# Patient Record
Sex: MALE | Race: WHITE | NOT HISPANIC OR LATINO | Employment: OTHER | URBAN - NONMETROPOLITAN AREA
[De-identification: names, ages, dates, MRNs, and addresses within clinical notes are randomized per-mention and may not be internally consistent; named-entity substitution may affect disease eponyms.]

---

## 2020-10-29 DIAGNOSIS — M25.512 ACUTE PAIN OF LEFT SHOULDER: Primary | ICD-10-CM

## 2020-11-03 ENCOUNTER — OFFICE VISIT (OUTPATIENT)
Dept: ORTHOPEDIC SURGERY | Facility: CLINIC | Age: 65
End: 2020-11-03

## 2020-11-03 VITALS — BODY MASS INDEX: 34.06 KG/M2 | HEIGHT: 73 IN | WEIGHT: 257 LBS

## 2020-11-03 DIAGNOSIS — M19.019 AC JOINT ARTHROPATHY: ICD-10-CM

## 2020-11-03 DIAGNOSIS — M25.512 ACUTE PAIN OF LEFT SHOULDER: Primary | ICD-10-CM

## 2020-11-03 DIAGNOSIS — M75.42 IMPINGEMENT SYNDROME OF LEFT SHOULDER: ICD-10-CM

## 2020-11-03 DIAGNOSIS — M75.102 ROTATOR CUFF SYNDROME OF LEFT SHOULDER: ICD-10-CM

## 2020-11-03 PROCEDURE — 99203 OFFICE O/P NEW LOW 30 MIN: CPT | Performed by: NURSE PRACTITIONER

## 2020-11-03 PROCEDURE — 20610 DRAIN/INJ JOINT/BURSA W/O US: CPT | Performed by: NURSE PRACTITIONER

## 2020-11-03 RX ORDER — GEMFIBROZIL 600 MG/1
600 TABLET, FILM COATED ORAL DAILY
COMMUNITY
Start: 2020-08-28

## 2020-11-03 RX ORDER — LOPERAMIDE HCL/SIMETHICONE 2-125MG
TABLET ORAL
COMMUNITY

## 2020-11-03 RX ORDER — AMLODIPINE BESYLATE 5 MG/1
5 TABLET ORAL DAILY
COMMUNITY
Start: 2020-10-15

## 2020-11-03 RX ORDER — PIOGLITAZONEHYDROCHLORIDE 30 MG/1
30 TABLET ORAL DAILY
COMMUNITY
Start: 2020-10-15

## 2020-11-03 RX ORDER — LIDOCAINE HYDROCHLORIDE 10 MG/ML
2 INJECTION, SOLUTION INFILTRATION; PERINEURAL
Status: COMPLETED | OUTPATIENT
Start: 2020-11-03 | End: 2020-11-03

## 2020-11-03 RX ORDER — TRIAMCINOLONE ACETONIDE 40 MG/ML
40 INJECTION, SUSPENSION INTRA-ARTICULAR; INTRAMUSCULAR
Status: COMPLETED | OUTPATIENT
Start: 2020-11-03 | End: 2020-11-03

## 2020-11-03 RX ORDER — CHLORAL HYDRATE 500 MG
CAPSULE ORAL
COMMUNITY

## 2020-11-03 RX ORDER — ATORVASTATIN CALCIUM 20 MG/1
20 TABLET, FILM COATED ORAL DAILY
COMMUNITY
Start: 2020-08-28

## 2020-11-03 RX ORDER — ESOMEPRAZOLE MAGNESIUM 20 MG/1
FOR SUSPENSION ORAL
COMMUNITY

## 2020-11-03 RX ORDER — LOSARTAN POTASSIUM 100 MG/1
100 TABLET ORAL DAILY
COMMUNITY
Start: 2020-10-15

## 2020-11-03 RX ADMIN — TRIAMCINOLONE ACETONIDE 40 MG: 40 INJECTION, SUSPENSION INTRA-ARTICULAR; INTRAMUSCULAR at 14:03

## 2020-11-03 RX ADMIN — LIDOCAINE HYDROCHLORIDE 2 ML: 10 INJECTION, SOLUTION INFILTRATION; PERINEURAL at 14:03

## 2020-11-03 NOTE — PROGRESS NOTES
Sergey Yanez is a 65 y.o. male   Primary provider:  Provider, No Known       Chief Complaint   Patient presents with   • Left Shoulder - Pain, Initial Evaluation     HISTORY OF PRESENT ILLNESS:    65-year-old male patient presents to office for evaluation of acute left shoulder pain.  Onset of pain occurred approximately 2 months ago with no known injury.  Patient complains of difficulty with use of his left arm, limited range of motion and weakness.  Patient complains of stiffness and limitations in the joint.  Pain is described as intermittent and severe.  Pain is described as aching in nature.  No swelling or bruising reported.  No numbness or tingling.  Pain is worse with movement/use of the left arm/shoulder and lifting.  Patient has tried rest and anti-inflammatory medications with minimal improvement.  Patient has been taking Ibuprofen 800 mg twice daily.  Pain scale today is 2/10.  X-rays are performed in office today.    Pain  This is a new problem. The current episode started more than 1 month ago (2 months). The problem occurs intermittently. The problem has been gradually worsening. Associated symptoms include arthralgias and weakness (Left arm/shoulder). Pertinent negatives include no joint swelling. Associated symptoms comments: Aching pain, limited ROM. Exacerbated by: movement/use of left arm, lifting. He has tried NSAIDs and rest for the symptoms. The treatment provided mild relief.     CONCURRENT MEDICAL HISTORY:    Past Medical History:   Diagnosis Date   • Diabetes (CMS/[facility])    • GERD (gastroesophageal reflux disease)    • Hyperlipidemia    • Hypertension    • Rotator cuff syndrome of left shoulder 11/3/2020       Allergies   Allergen Reactions   • Penicillins Unknown - Low Severity         Current Outpatient Medications:   •  amLODIPine (NORVASC) 5 MG tablet, Take 5 mg by mouth Daily., Disp: , Rfl:   •  atorvastatin (LIPITOR) 20 MG tablet, Take 20 mg by mouth Daily., Disp: , Rfl:   •   esomeprazole (nexIUM) 20 MG packet, Nexium 20 mg capsule,delayed release  Take 1 capsule every day by oral route., Disp: , Rfl:   •  gemfibrozil (LOPID) 600 MG tablet, Take 600 mg by mouth Daily., Disp: , Rfl:   •  Loperamide-Simethicone (Imodium Multi-Symptom Relief) 2-125 MG tablet, Antidiarrheal 2 mg tablet  Take 1 tablet every day by oral route., Disp: , Rfl:   •  losartan (COZAAR) 100 MG tablet, Take 100 mg by mouth Daily., Disp: , Rfl:   •  metFORMIN (GLUCOPHAGE) 500 MG tablet, TK 2 TS PO BID WITH OR AFTER MEALS, Disp: , Rfl:   •  Omega-3 Fatty Acids (fish oil) 1000 MG capsule capsule, Fish Oil, Disp: , Rfl:   •  pioglitazone (ACTOS) 30 MG tablet, Take 30 mg by mouth Daily., Disp: , Rfl:     Past Surgical History:   Procedure Laterality Date   • GALLBLADDER SURGERY  2000   • JOINT REPLACEMENT Right 02/2016    Right knee replacement       Family History   Problem Relation Age of Onset   • Hypertension Father    • Clotting disorder Father    • Hypertension Sister    • Diabetes Sister    • Clotting disorder Sister         Social History     Socioeconomic History   • Marital status:      Spouse name: Not on file   • Number of children: Not on file   • Years of education: Not on file   • Highest education level: Not on file   Tobacco Use   • Smoking status: Never Smoker   • Smokeless tobacco: Never Used   Substance and Sexual Activity   • Alcohol use: Yes     Comment: 12 a week   • Drug use: Never   • Sexual activity: Defer        Review of Systems   Constitutional: Positive for activity change.   HENT: Positive for tinnitus.    Eyes: Negative.    Respiratory: Negative.    Cardiovascular: Negative.    Gastrointestinal: Negative.    Endocrine: Negative.    Genitourinary: Negative.    Musculoskeletal: Positive for arthralgias. Negative for joint swelling.        Right shoulder pain. Joint stiffness.    Allergic/Immunologic: Negative.    Neurological: Positive for weakness (Left arm/shoulder).   Hematological:  "Negative.    Psychiatric/Behavioral: Positive for sleep disturbance.       PHYSICAL EXAMINATION:       Ht 185.4 cm (73\")   Wt 117 kg (257 lb)   BMI 33.91 kg/m²     Physical Exam  Vitals signs reviewed.   Constitutional:       General: He is not in acute distress.     Appearance: He is well-developed. He is not ill-appearing.   HENT:      Head: Normocephalic.   Pulmonary:      Effort: Pulmonary effort is normal. No respiratory distress.   Abdominal:      General: There is no distension.      Palpations: Abdomen is soft.   Musculoskeletal:         General: Tenderness (Left shoulder) present. No swelling, deformity or signs of injury.   Skin:     General: Skin is warm and dry.      Capillary Refill: Capillary refill takes less than 2 seconds.      Findings: No erythema.   Neurological:      Mental Status: He is alert and oriented to person, place, and time.      GCS: GCS eye subscore is 4. GCS verbal subscore is 5. GCS motor subscore is 6.   Psychiatric:         Speech: Speech normal.         Behavior: Behavior normal.         Thought Content: Thought content normal.         Judgment: Judgment normal.         GAIT:     [x]  Normal  []  Antalgic    Assistive device: [x]  None  []  Walker     []  Crutches  []  Cane     []  Wheelchair  []  Stretcher    Right Shoulder Exam     Tenderness   The patient is experiencing no tenderness.    Range of Motion   The patient has normal right shoulder ROM.    Muscle Strength   The patient has normal right shoulder strength.    Tests   Duarte test: negative  Cross arm: negative  Impingement: negative  Drop arm: negative    Other   Erythema: absent  Sensation: normal  Pulse: present      Left Shoulder Exam     Tenderness   The patient is experiencing tenderness in the acromioclavicular joint (Mild, diffuse).    Range of Motion   Active abduction: 120   Passive abduction: 160   Extension: 40   External rotation: 80   Forward flexion: 150   Internal rotation 90 degrees: 70     Muscle " Strength   Abduction: 3/5   Supraspinatus: 3/5     Tests   Duarte test: positive  Cross arm: positive  Impingement: positive  Drop arm: negative    Other   Erythema: absent  Sensation: normal  Pulse: present     Comments:  Pain and limitations with range of motion.  Pain increases against resistance.  Weakness is noted.  Empty can test positive.  Full can test positive.  Impingement sign positive.  No swelling appreciated.  No erythema.  No warmth.  No signs of infection noted.  Neurovascularly intact.            Xr Shoulder 2+ View Left    Result Date: 11/3/2020  Narrative: External rotation, internal rotation and scapular Y views of the left shoulder reveal no evidence of acute fracture or dislocation.  There are advanced degenerative changes noted at the acromioclavicular joint with very diminished joint spacing and osteophyte formations noted.  There are milder degenerative changes noted at the glenohumeral joint.  There are some mild subchondral cystic changes noted in the superior aspect of the humeral head.  The humeral head appears well-positioned within the glenoid.  Soft tissues appear unremarkable.  No acute bony radiologic abnormalities are noted at this time.  No comparison images are available for review.11/03/20 at 15:53 CST by TOM Crowe     Large Joint Arthrocentesis: L subacromial bursa  Date/Time: 11/3/2020 2:03 PM  Consent given by: patient  Timeout: Immediately prior to procedure a time out was called to verify the correct patient, procedure, equipment, support staff and site/side marked as required   Supporting Documentation  Indications: pain and diagnostic evaluation   Procedure Details  Location: shoulder - L subacromial bursa  Preparation: Patient was prepped and draped in the usual sterile fashion  Needle size: 22 G  Approach: posterior  Medications administered: 2 mL lidocaine 1 %; 40 mg triamcinolone acetonide 40 MG/ML  Patient tolerance: patient tolerated the procedure well  with no immediate complications        ASSESSMENT:    Diagnoses and all orders for this visit:    Acute pain of left shoulder  -     Large Joint Arthrocentesis: L subacromial bursa  -     MRI Shoulder Left Without Contrast; Future    AC joint arthropathy  -     Large Joint Arthrocentesis: L subacromial bursa  -     MRI Shoulder Left Without Contrast; Future    Impingement syndrome of left shoulder  -     Large Joint Arthrocentesis: L subacromial bursa  -     MRI Shoulder Left Without Contrast; Future    Rotator cuff syndrome of left shoulder  -     Large Joint Arthrocentesis: L subacromial bursa  -     MRI Shoulder Left Without Contrast; Future    PLAN    X-rays of the left shoulder performed in office today reviewed with no acute findings noted.  Patient has some fairly advanced degenerative changes noted in the AC joint.  The glenohumeral joint has some mild degenerative changes but overall appears in satisfactory condition.  Patient complains of left shoulder pain that started approximately 2 months ago and has continued to progressively worsen.  He has severe pain at times.  He has weakness and difficulty lifting his left arm.  Subjective complaints and physical exam are suspicious for impingement, rotator cuff pathology and possible rotator cuff tear.  Recommend MRI of the left shoulder to evaluate for rotator cuff tear, tendinitis/tendinosis, bursitis and/or impingement.  Recommend a subacromial injection of steroid today to the left shoulder for management of pain/inflammation.  Recommend rest and activity modification as tolerated and based on his pain with avoidance of heavy lifting, pulling, tugging and repetitive motions.  We discussed some gentle, progressive range of motion exercises with the left shoulder and pendulum exercises as tolerated.  Recommend ice therapy to the left shoulder intermittently 3-4 times daily for 15 minutes at a time to minimize pain/inflammation.  Patient is already taking  Ibuprofen 800 mg twice daily, which helps with his chronic right knee pain.  He can continue to take the Ibuprofen 800 mg.  Patient can also take Tylenol as needed for additional pain control.  We discussed that he may need a referral to physical therapy to help with range of motion and strengthening/conditioning of the left shoulder.  Follow-up after MRI to discuss results and discuss further treatment recommendations.    Patient's Body mass index is 33.91 kg/m². BMI is above normal parameters. Recommendations include: exercise counseling and nutrition counseling.    Return for follow up after MRI.      This document has been electronically signed by TOM Crowe on November 3, 2020 16:00 CST      TOM Crowe

## 2020-11-20 DIAGNOSIS — M75.42 IMPINGEMENT SYNDROME OF LEFT SHOULDER: ICD-10-CM

## 2020-11-20 DIAGNOSIS — M75.102 ROTATOR CUFF SYNDROME OF LEFT SHOULDER: ICD-10-CM

## 2020-11-20 DIAGNOSIS — M19.019 AC JOINT ARTHROPATHY: ICD-10-CM

## 2020-11-20 DIAGNOSIS — M25.512 ACUTE PAIN OF LEFT SHOULDER: ICD-10-CM

## 2020-12-04 ENCOUNTER — OFFICE VISIT (OUTPATIENT)
Dept: ORTHOPEDIC SURGERY | Facility: CLINIC | Age: 65
End: 2020-12-04

## 2020-12-04 VITALS — HEIGHT: 73 IN | BODY MASS INDEX: 33.93 KG/M2 | WEIGHT: 256 LBS

## 2020-12-04 DIAGNOSIS — M75.112 NONTRAUMATIC INCOMPLETE TEAR OF LEFT ROTATOR CUFF: ICD-10-CM

## 2020-12-04 DIAGNOSIS — M25.512 ACUTE PAIN OF LEFT SHOULDER: Primary | ICD-10-CM

## 2020-12-04 DIAGNOSIS — M75.42 IMPINGEMENT SYNDROME OF LEFT SHOULDER: ICD-10-CM

## 2020-12-04 DIAGNOSIS — M75.102 ROTATOR CUFF SYNDROME OF LEFT SHOULDER: ICD-10-CM

## 2020-12-04 DIAGNOSIS — M19.019 AC JOINT ARTHROPATHY: ICD-10-CM

## 2020-12-04 PROCEDURE — 99213 OFFICE O/P EST LOW 20 MIN: CPT | Performed by: NURSE PRACTITIONER

## 2020-12-04 PROCEDURE — 20610 DRAIN/INJ JOINT/BURSA W/O US: CPT | Performed by: NURSE PRACTITIONER

## 2020-12-04 RX ADMIN — LIDOCAINE HYDROCHLORIDE 2 ML: 10 INJECTION, SOLUTION INFILTRATION; PERINEURAL at 10:33

## 2020-12-04 RX ADMIN — TRIAMCINOLONE ACETONIDE 80 MG: 40 INJECTION, SUSPENSION INTRA-ARTICULAR; INTRAMUSCULAR at 10:33

## 2020-12-04 NOTE — PROGRESS NOTES
"Sergey Yanez is a 65 y.o. male returns for     Chief Complaint   Patient presents with   • Left Shoulder - Follow-up, Pain   • Results     mri done on 11/19/2020       HISTORY OF PRESENT ILLNESS: Patient presents to office for follow-up of acute left shoulder pain and MRI results of left shoulder.  Onset of pain occurred approximately 3 months ago with no known injury.  Patient was given a subacromial injection of steroid at last office visit on 11/3/2020, which offered him good pain improvement for a few weeks and then the pain returned.  Patient has tried taking Ibuprofen 800 mg with some mild improvement also.  No new complaints or concerns noted since last office visit.  No falls or injuries reported.      CONCURRENT MEDICAL HISTORY:    The following portions of the patient's history were reviewed and updated as appropriate: allergies, current medications, past family history, past medical history, past social history, past surgical history and problem list.     ROS  No fevers or chills.  No chest pain or shortness of air.  No GI or  disturbances. Left shoulder pain.     PHYSICAL EXAMINATION:       Ht 185.4 cm (73\")   Wt 116 kg (256 lb)   BMI 33.78 kg/m²     Physical Exam  Vitals signs reviewed.   Constitutional:       General: He is not in acute distress.     Appearance: Normal appearance. He is well-developed. He is not ill-appearing.   HENT:      Head: Normocephalic.   Pulmonary:      Effort: Pulmonary effort is normal. No respiratory distress.   Abdominal:      General: There is no distension.      Palpations: Abdomen is soft.   Musculoskeletal:         General: Tenderness (Mild, left shoulder) present. No swelling, deformity or signs of injury.   Skin:     General: Skin is warm and dry.      Capillary Refill: Capillary refill takes less than 2 seconds.      Findings: No erythema.   Neurological:      Mental Status: He is alert and oriented to person, place, and time.      GCS: GCS eye subscore is " 4. GCS verbal subscore is 5. GCS motor subscore is 6.   Psychiatric:         Speech: Speech normal.         Behavior: Behavior normal.         Thought Content: Thought content normal.         Judgment: Judgment normal.         GAIT:     [x]  Normal  []  Antalgic    Assistive device: [x]  None  []  Walker     []  Crutches  []  Cane     []  Wheelchair  []  Stretcher    Right Shoulder Exam     Tenderness   The patient is experiencing no tenderness.    Range of Motion   The patient has normal right shoulder ROM.    Muscle Strength   The patient has normal right shoulder strength.    Tests   Duarte test: negative  Cross arm: negative  Impingement: negative  Drop arm: negative    Other   Erythema: absent  Sensation: normal  Pulse: present      Left Shoulder Exam     Tenderness   The patient is experiencing tenderness in the acromioclavicular joint (Mild, diffuse).    Range of Motion   Active abduction: 120   Passive abduction: 160   Extension: 40   External rotation: 80   Forward flexion: 150   Internal rotation 90 degrees: 70     Muscle Strength   Abduction: 3/5   Supraspinatus: 3/5     Tests   Duarte test: positive  Cross arm: positive  Impingement: positive  Drop arm: negative    Other   Erythema: absent  Sensation: normal  Pulse: present     Comments:  Pain and limitations with range of motion.  Pain increases against resistance.  Weakness is noted.  Empty can test positive.  Full can test positive.  Impingement sign positive.  No swelling appreciated.  No erythema.  No warmth.  No signs of infection noted.  Neurovascularly intact.            Xr Shoulder 2+ View Left     Result Date: 11/3/2020  Narrative: External rotation, internal rotation and scapular Y views of the left shoulder reveal no evidence of acute fracture or dislocation.  There are advanced degenerative changes noted at the acromioclavicular joint with very diminished joint spacing and osteophyte formations noted.  There are milder degenerative changes  noted at the glenohumeral joint.  There are some mild subchondral cystic changes noted in the superior aspect of the humeral head.  The humeral head appears well-positioned within the glenoid.  Soft tissues appear unremarkable.  No acute bony radiologic abnormalities are noted at this time.  No comparison images are available for review.11/03/20 at 15:53 CST by TOM Crowe           ASSESSMENT:    Diagnoses and all orders for this visit:    Acute pain of left shoulder  -     Large Joint Arthrocentesis: L subacromial bursa    AC joint arthropathy  -     Large Joint Arthrocentesis: L subacromial bursa    Impingement syndrome of left shoulder  -     Large Joint Arthrocentesis: L subacromial bursa    Rotator cuff syndrome of left shoulder  -     Large Joint Arthrocentesis: L subacromial bursa    Nontraumatic incomplete tear of left rotator cuff  -     Large Joint Arthrocentesis: L subacromial bursa    PLAN    Large Joint Arthrocentesis: L subacromial bursa  Date/Time: 12/4/2020 10:33 AM  Consent given by: patient  Timeout: Immediately prior to procedure a time out was called to verify the correct patient, procedure, equipment, support staff and site/side marked as required   Supporting Documentation  Indications: pain and diagnostic evaluation   Procedure Details  Location: shoulder - L subacromial bursa  Preparation: Patient was prepped and draped in the usual sterile fashion  Needle size: 22 G  Approach: posterior  Medications administered: 2 mL lidocaine 1 %; 80 mg triamcinolone acetonide 40 MG/ML  Patient tolerance: patient tolerated the procedure well with no immediate complications      MRI of the left shoulder reviewed and results discussed with patient.  We discussed evidence of hypertrophic change in the acromioclavicular joint, bursitis and a partial tear of the supraspinatus tendon.  We discussed conservative treatment efforts versus surgical consult for possible shoulder arthroscopy and subacromial  decompression.  No indication is warranted for rotator cuff repair and we discussed this.  Patient wants to continue with conservative treatment efforts at this time.  Patient had good pain improvement with the previous injection given on 11/3/2020, but the injection only lasted a couple of weeks and the patient admits today that he likely overused his arm as it was feeling much better.  Recommend a repeat subacromial injection of steroid today to the left shoulder for management of pain/inflammation.  We discussed GRADUAL progression of use of the left arm/shoulder.  We discussed the importance of not overusing it for exertional activity such as heavy lifting, pulling, tugging and repetitive motions for now.  Recommend gentle, progressive range of motion exercises as tolerated and based on his pain.  We also discussed that a referral for formal physical therapy would likely be beneficial.  The patient declines this today and wants to repeat the injection and see if he improves again.  Recommend ice therapy to the left shoulder as needed to minimize pain/inflammation.  Recommend to continue with Tylenol or I800 mg as needed for pain/discomfort. Follow-up in 4 to 6 weeks for recheck.    Return in about 6 weeks (around 1/15/2021) for Recheck.      This document has been electronically signed by TOM Crowe on December 6, 2020 17:50 CST      TOM Crowe

## 2020-12-06 RX ORDER — TRIAMCINOLONE ACETONIDE 40 MG/ML
80 INJECTION, SUSPENSION INTRA-ARTICULAR; INTRAMUSCULAR
Status: COMPLETED | OUTPATIENT
Start: 2020-12-04 | End: 2020-12-04

## 2020-12-06 RX ORDER — LIDOCAINE HYDROCHLORIDE 10 MG/ML
2 INJECTION, SOLUTION INFILTRATION; PERINEURAL
Status: COMPLETED | OUTPATIENT
Start: 2020-12-04 | End: 2020-12-04